# Patient Record
Sex: FEMALE | Race: WHITE | Employment: FULL TIME | URBAN - METROPOLITAN AREA
[De-identification: names, ages, dates, MRNs, and addresses within clinical notes are randomized per-mention and may not be internally consistent; named-entity substitution may affect disease eponyms.]

---

## 2017-01-09 DIAGNOSIS — I25.700 CORONARY ARTERY DISEASE INVOLVING CORONARY BYPASS GRAFT OF NATIVE HEART WITH UNSTABLE ANGINA PECTORIS (HCC): ICD-10-CM

## 2017-01-09 RX ORDER — PRASUGREL 10 MG/1
10 TABLET, FILM COATED ORAL DAILY
Qty: 30 TABLET | Refills: 12 | Status: SHIPPED | OUTPATIENT
Start: 2017-01-09 | End: 2017-01-18 | Stop reason: SDUPTHER

## 2017-01-11 ENCOUNTER — HOSPITAL ENCOUNTER (OUTPATIENT)
Dept: GENERAL RADIOLOGY | Age: 47
Discharge: HOME | End: 2017-01-11
Attending: INTERNAL MEDICINE | Admitting: INTERNAL MEDICINE

## 2017-01-11 ENCOUNTER — HOSPITAL ENCOUNTER (OUTPATIENT)
Dept: LAB | Age: 47
Discharge: HOME | End: 2017-01-11

## 2017-01-11 DIAGNOSIS — I25.700 CORONARY ARTERY DISEASE INVOLVING CORONARY BYPASS GRAFT OF NATIVE HEART WITH UNSTABLE ANGINA PECTORIS (HCC): Primary | ICD-10-CM

## 2017-01-11 DIAGNOSIS — I25.700 ATHEROSCLEROSIS OF CORONARY ARTERY BYPASS GRAFT WITH UNSTABLE ANGINA PECTORIS, UNSPECIFIED WHETHER NATIVE OR TRANSPLANTED HEART (HCC): ICD-10-CM

## 2017-01-11 LAB
ALBUMIN SERPL-MCNC: 4 GM/DL (ref 3.4–5)
ALP BLD-CCNC: 68 IU/L (ref 40–129)
ALT SERPL-CCNC: 10 U/L (ref 10–40)
ANION GAP SERPL CALCULATED.3IONS-SCNC: 8 MMOL/L (ref 4–16)
AST SERPL-CCNC: 9 IU/L (ref 15–37)
BILIRUB SERPL-MCNC: 0.5 MG/DL (ref 0–1)
BILIRUBIN DIRECT: 0.2 MG/DL (ref 0–0.3)
BILIRUBIN, INDIRECT: 0.3 MG/DL (ref 0–0.7)
BUN BLDV-MCNC: 14 MG/DL (ref 6–23)
CALCIUM SERPL-MCNC: 9.4 MG/DL (ref 8.3–10.6)
CHLORIDE BLD-SCNC: 106 MMOL/L (ref 99–110)
CHOLESTEROL: 174 MG/DL
CK MB: 1 NG/ML
CO2: 30 MMOL/L (ref 21–32)
CREAT SERPL-MCNC: 0.9 MG/DL (ref 0.6–1.1)
ERYTHROCYTE SEDIMENTATION RATE: 10 MM/HR (ref 0–20)
GFR AFRICAN AMERICAN: >60 ML/MIN/1.73M2
GFR NON-AFRICAN AMERICAN: >60 ML/MIN/1.73M2
GLUCOSE BLD-MCNC: 107 MG/DL (ref 70–140)
HCT VFR BLD CALC: 43.1 % (ref 37–47)
HDLC SERPL-MCNC: 48 MG/DL
HEMOGLOBIN: 13.6 GM/DL (ref 12.5–16)
LDL CHOLESTEROL CALCULATED: 97 MG/DL
MAGNESIUM: 2 MG/DL (ref 1.8–2.4)
MCH RBC QN AUTO: 29.8 PG (ref 27–31)
MCHC RBC AUTO-ENTMCNC: 31.6 % (ref 32–36)
MCV RBC AUTO: 94.3 FL (ref 78–100)
PDW BLD-RTO: 13.1 % (ref 11.7–14.9)
PLATELET # BLD: 292 K/CU MM (ref 140–440)
PMV BLD AUTO: 8.9 FL (ref 7.5–11.1)
POTASSIUM SERPL-SCNC: 4.3 MMOL/L (ref 3.5–5.1)
RBC # BLD: 4.57 M/CU MM (ref 4.2–5.4)
SODIUM BLD-SCNC: 144 MMOL/L (ref 135–145)
T4 FREE: 1.35 NG/DL (ref 0.9–1.8)
TOTAL PROTEIN: 6.1 GM/DL (ref 6.4–8.2)
TRIGL SERPL-MCNC: 146 MG/DL
TSH HIGH SENSITIVITY: 0.66 UIU/ML (ref 0.27–4.2)
WBC # BLD: 11.1 K/CU MM (ref 4–10.5)

## 2017-01-13 LAB
CK MACROMOLECULAR TYPE 1/CK-TOTAL: 0
CK MACROMOLECULAR TYPE 2/CK-TOTAL: 0
CK MB: 0
CK-BB: 0
CK-MM: 100
TOTAL CK: 28

## 2017-01-18 DIAGNOSIS — I25.700 CORONARY ARTERY DISEASE INVOLVING CORONARY BYPASS GRAFT OF NATIVE HEART WITH UNSTABLE ANGINA PECTORIS (HCC): ICD-10-CM

## 2017-01-18 RX ORDER — PRASUGREL 10 MG/1
10 TABLET, FILM COATED ORAL DAILY
Qty: 90 TABLET | Refills: 3 | Status: SHIPPED | OUTPATIENT
Start: 2017-01-18 | End: 2018-02-01 | Stop reason: SDUPTHER

## 2017-01-18 RX ORDER — PRASUGREL 10 MG/1
10 TABLET, FILM COATED ORAL DAILY
Qty: 7 TABLET | Refills: 0 | COMMUNITY
Start: 2017-01-18 | End: 2017-01-19 | Stop reason: SDUPTHER

## 2017-01-19 ENCOUNTER — TELEPHONE (OUTPATIENT)
Dept: CARDIOLOGY CLINIC | Age: 47
End: 2017-01-19

## 2017-01-19 DIAGNOSIS — I25.700 CORONARY ARTERY DISEASE INVOLVING CORONARY BYPASS GRAFT OF NATIVE HEART WITH UNSTABLE ANGINA PECTORIS (HCC): ICD-10-CM

## 2017-01-19 RX ORDER — PRASUGREL 10 MG/1
10 TABLET, FILM COATED ORAL DAILY
Qty: 14 TABLET | Refills: 0 | Status: ON HOLD | COMMUNITY
Start: 2017-01-19 | End: 2017-07-06 | Stop reason: SDUPTHER

## 2017-03-20 ENCOUNTER — HOSPITAL ENCOUNTER (OUTPATIENT)
Dept: LAB | Age: 47
Discharge: HOME | End: 2017-03-20
Attending: INTERNAL MEDICINE | Admitting: INTERNAL MEDICINE

## 2017-03-20 DIAGNOSIS — I25.700 CORONARY ARTERY DISEASE INVOLVING CORONARY BYPASS GRAFT OF NATIVE HEART WITH UNSTABLE ANGINA PECTORIS (HCC): Primary | ICD-10-CM

## 2017-03-20 LAB
ANION GAP SERPL CALCULATED.3IONS-SCNC: 8 MMOL/L (ref 4–16)
BUN BLDV-MCNC: 20 MG/DL (ref 6–23)
CALCIUM SERPL-MCNC: 8.8 MG/DL (ref 8.3–10.6)
CHLORIDE BLD-SCNC: 104 MMOL/L (ref 99–110)
CO2: 30 MMOL/L (ref 21–32)
CREAT SERPL-MCNC: 0.9 MG/DL (ref 0.6–1.1)
GFR AFRICAN AMERICAN: >60 ML/MIN/1.73M2
GFR NON-AFRICAN AMERICAN: >60 ML/MIN/1.73M2
GLUCOSE BLD-MCNC: 102 MG/DL (ref 70–140)
POTASSIUM SERPL-SCNC: 4.3 MMOL/L (ref 3.5–5.1)
SODIUM BLD-SCNC: 142 MMOL/L (ref 135–145)

## 2017-04-01 LAB
CHOLESTEROL: 203 MG/DL
GLUCOSE BLD-MCNC: 134 MG/DL (ref 70–140)
HBA1C MFR BLD: 5.1 % (ref 4.2–6.3)
HDLC SERPL-MCNC: 48 MG/DL
LDL CHOLESTEROL CALCULATED: 121 MG/DL
PATIENT FASTING?: YES
TRIGL SERPL-MCNC: 172 MG/DL

## 2017-04-04 ENCOUNTER — EMPLOYEE WELLNESS (OUTPATIENT)
Dept: OTHER | Age: 47
End: 2017-04-04

## 2017-04-17 ENCOUNTER — TELEPHONE (OUTPATIENT)
Dept: CARDIOLOGY CLINIC | Age: 47
End: 2017-04-17

## 2017-05-14 DIAGNOSIS — I25.700 CORONARY ARTERY DISEASE INVOLVING CORONARY BYPASS GRAFT OF NATIVE HEART WITH UNSTABLE ANGINA PECTORIS (HCC): Primary | ICD-10-CM

## 2017-05-14 RX ORDER — CARVEDILOL 3.12 MG/1
TABLET ORAL
Qty: 180 TABLET | Refills: 3 | Status: SHIPPED | OUTPATIENT
Start: 2017-05-14 | End: 2017-05-16 | Stop reason: SDUPTHER

## 2017-05-16 DIAGNOSIS — I25.700 CORONARY ARTERY DISEASE INVOLVING CORONARY BYPASS GRAFT OF NATIVE HEART WITH UNSTABLE ANGINA PECTORIS (HCC): ICD-10-CM

## 2017-05-16 DIAGNOSIS — I25.700 CORONARY ARTERY DISEASE INVOLVING CORONARY BYPASS GRAFT OF NATIVE HEART WITH UNSTABLE ANGINA PECTORIS (HCC): Primary | ICD-10-CM

## 2017-05-16 RX ORDER — ISOSORBIDE MONONITRATE 120 MG/1
120 TABLET, EXTENDED RELEASE ORAL DAILY
Qty: 90 TABLET | Refills: 3 | Status: SHIPPED | OUTPATIENT
Start: 2017-05-16 | End: 2019-03-15 | Stop reason: SDUPTHER

## 2017-05-16 RX ORDER — CARVEDILOL 3.12 MG/1
TABLET ORAL
Qty: 180 TABLET | Refills: 3 | Status: ON HOLD | OUTPATIENT
Start: 2017-05-16 | End: 2017-07-07 | Stop reason: HOSPADM

## 2017-05-16 RX ORDER — CARVEDILOL 3.12 MG/1
TABLET ORAL
Qty: 180 TABLET | Refills: 3 | OUTPATIENT
Start: 2017-05-16 | End: 2017-05-16 | Stop reason: SDUPTHER

## 2017-05-16 RX ORDER — CARVEDILOL 3.12 MG/1
TABLET ORAL
Qty: 180 TABLET | Refills: 3 | Status: SHIPPED | OUTPATIENT
Start: 2017-05-16 | End: 2017-05-16 | Stop reason: SDUPTHER

## 2017-05-16 RX ORDER — ISOSORBIDE MONONITRATE 120 MG/1
120 TABLET, EXTENDED RELEASE ORAL DAILY
Qty: 90 TABLET | Refills: 3 | OUTPATIENT
Start: 2017-05-16 | End: 2017-05-16 | Stop reason: SDUPTHER

## 2017-05-16 RX ORDER — ISOSORBIDE MONONITRATE 60 MG/1
120 TABLET, EXTENDED RELEASE ORAL DAILY
Qty: 90 TABLET | Refills: 3 | Status: SHIPPED | OUTPATIENT
Start: 2017-05-16 | End: 2017-05-16 | Stop reason: SDUPTHER

## 2017-06-02 ENCOUNTER — TELEPHONE (OUTPATIENT)
Dept: CARDIOLOGY CLINIC | Age: 47
End: 2017-06-02

## 2017-06-05 ENCOUNTER — TELEPHONE (OUTPATIENT)
Dept: CARDIOLOGY CLINIC | Age: 47
End: 2017-06-05

## 2017-06-26 ENCOUNTER — TELEPHONE (OUTPATIENT)
Dept: CARDIOLOGY CLINIC | Age: 47
End: 2017-06-26

## 2017-06-26 DIAGNOSIS — R07.9 CHEST PAIN, UNSPECIFIED TYPE: Primary | ICD-10-CM

## 2017-06-27 ENCOUNTER — PROCEDURE VISIT (OUTPATIENT)
Dept: CARDIOLOGY CLINIC | Age: 47
End: 2017-06-27

## 2017-06-27 DIAGNOSIS — Z98.61 HISTORY OF PTCA: ICD-10-CM

## 2017-06-27 DIAGNOSIS — Z95.1 HX OF CABG: Primary | ICD-10-CM

## 2017-06-27 DIAGNOSIS — R07.9 CHEST PAIN, UNSPECIFIED TYPE: ICD-10-CM

## 2017-06-27 DIAGNOSIS — I20.8 ANGINA EFFORT: ICD-10-CM

## 2017-06-27 LAB
LV EF: 60 %
LVEF MODALITY: NORMAL

## 2017-06-27 PROCEDURE — 78452 HT MUSCLE IMAGE SPECT MULT: CPT | Performed by: INTERNAL MEDICINE

## 2017-06-27 PROCEDURE — 93018 CV STRESS TEST I&R ONLY: CPT | Performed by: INTERNAL MEDICINE

## 2017-06-27 PROCEDURE — 93016 CV STRESS TEST SUPVJ ONLY: CPT | Performed by: INTERNAL MEDICINE

## 2017-06-27 PROCEDURE — 93017 CV STRESS TEST TRACING ONLY: CPT | Performed by: INTERNAL MEDICINE

## 2017-06-27 PROCEDURE — A9500 TC99M SESTAMIBI: HCPCS | Performed by: INTERNAL MEDICINE

## 2017-06-28 ENCOUNTER — TELEPHONE (OUTPATIENT)
Dept: CARDIOLOGY CLINIC | Age: 47
End: 2017-06-28

## 2017-06-28 PROBLEM — I20.89 ANGINA EFFORT: Status: ACTIVE | Noted: 2017-06-28

## 2017-06-28 PROBLEM — I20.8 ANGINA EFFORT: Status: ACTIVE | Noted: 2017-06-28

## 2017-06-30 ENCOUNTER — TELEPHONE (OUTPATIENT)
Dept: CARDIOLOGY CLINIC | Age: 47
End: 2017-06-30

## 2017-07-03 ENCOUNTER — HOSPITAL ENCOUNTER (OUTPATIENT)
Dept: LAB | Age: 47
Discharge: HOME | End: 2017-07-03
Attending: INTERNAL MEDICINE | Admitting: INTERNAL MEDICINE

## 2017-07-03 DIAGNOSIS — Z01.818 PRE-OP EVALUATION: ICD-10-CM

## 2017-07-03 LAB
ANION GAP SERPL CALCULATED.3IONS-SCNC: 11 MMOL/L (ref 4–16)
APTT: 30.1 SECONDS (ref 21.2–33)
BUN BLDV-MCNC: 14 MG/DL (ref 6–23)
CALCIUM SERPL-MCNC: 9.3 MG/DL (ref 8.3–10.6)
CHLORIDE BLD-SCNC: 102 MMOL/L (ref 99–110)
CO2: 29 MMOL/L (ref 21–32)
CREAT SERPL-MCNC: 0.9 MG/DL (ref 0.6–1.1)
GFR AFRICAN AMERICAN: >60 ML/MIN/1.73M2
GFR NON-AFRICAN AMERICAN: >60 ML/MIN/1.73M2
GLUCOSE FASTING: 91 MG/DL (ref 70–99)
HCT VFR BLD CALC: 43.3 % (ref 37–47)
HEMOGLOBIN: 14.4 GM/DL (ref 12.5–16)
INR BLD: 0.99 INDEX
MCH RBC QN AUTO: 30.8 PG (ref 27–31)
MCHC RBC AUTO-ENTMCNC: 33.3 % (ref 32–36)
MCV RBC AUTO: 92.7 FL (ref 78–100)
PDW BLD-RTO: 12.4 % (ref 11.7–14.9)
PLATELET # BLD: 308 K/CU MM (ref 140–440)
PMV BLD AUTO: 8.8 FL (ref 7.5–11.1)
POTASSIUM SERPL-SCNC: 4.5 MMOL/L (ref 3.5–5.1)
PROTHROMBIN TIME: 11.3 SECONDS (ref 9.12–12.5)
RBC # BLD: 4.67 M/CU MM (ref 4.2–5.4)
SODIUM BLD-SCNC: 142 MMOL/L (ref 135–145)
WBC # BLD: 9 K/CU MM (ref 4–10.5)

## 2017-07-11 ENCOUNTER — OFFICE VISIT (OUTPATIENT)
Dept: CARDIOLOGY CLINIC | Age: 47
End: 2017-07-11

## 2017-07-11 VITALS
WEIGHT: 168 LBS | DIASTOLIC BLOOD PRESSURE: 74 MMHG | BODY MASS INDEX: 28.68 KG/M2 | HEART RATE: 73 BPM | SYSTOLIC BLOOD PRESSURE: 100 MMHG | HEIGHT: 64 IN

## 2017-07-11 DIAGNOSIS — I20.0 UNSTABLE ANGINA (HCC): Primary | ICD-10-CM

## 2017-07-11 PROCEDURE — 99214 OFFICE O/P EST MOD 30 MIN: CPT | Performed by: INTERNAL MEDICINE

## 2017-07-11 PROCEDURE — 93000 ELECTROCARDIOGRAM COMPLETE: CPT | Performed by: INTERNAL MEDICINE

## 2017-08-14 ENCOUNTER — TELEPHONE (OUTPATIENT)
Dept: CARDIOLOGY CLINIC | Age: 47
End: 2017-08-14

## 2017-08-30 ENCOUNTER — TELEPHONE (OUTPATIENT)
Dept: CARDIOLOGY CLINIC | Age: 47
End: 2017-08-30

## 2017-10-01 ENCOUNTER — HOSPITAL ENCOUNTER (OUTPATIENT)
Dept: CARDIOLOGY | Age: 47
Discharge: HOME | End: 2017-10-31
Attending: INTERNAL MEDICINE | Admitting: INTERNAL MEDICINE

## 2017-11-01 ENCOUNTER — HOSPITAL ENCOUNTER (OUTPATIENT)
Dept: CARDIOLOGY | Age: 47
Discharge: HOME | End: 2017-11-30
Attending: INTERNAL MEDICINE | Admitting: INTERNAL MEDICINE

## 2017-11-06 ENCOUNTER — TELEPHONE (OUTPATIENT)
Dept: CARDIOLOGY CLINIC | Age: 47
End: 2017-11-06

## 2017-11-06 NOTE — TELEPHONE ENCOUNTER
Patient called requesting that her [facility] paperwork be redone as it only covers 6 months now instead of a year; she needs to have it begin on 11/24.   Call her with any questions at ph# [phone]

## 2017-11-07 ENCOUNTER — HOSPITAL ENCOUNTER (OUTPATIENT)
Dept: CARDIOLOGY | Age: 47
Discharge: HOME | End: 2017-09-30
Attending: INTERNAL MEDICINE | Admitting: INTERNAL MEDICINE

## 2017-11-10 ENCOUNTER — TELEPHONE (OUTPATIENT)
Dept: CARDIOLOGY CLINIC | Age: 47
End: 2017-11-10

## 2017-11-10 NOTE — TELEPHONE ENCOUNTER
Pt called stating she faxed [place] paperwork Monday or Tuesday this week and was making sure [facility] received it. She is going to re fax it just in case. Please call pt with any questions or updates.

## 2017-11-13 ENCOUNTER — TELEPHONE (OUTPATIENT)
Dept: CARDIOLOGY CLINIC | Age: 47
End: 2017-11-13

## 2017-11-13 NOTE — TELEPHONE ENCOUNTER
FMLA forms complete, scanned into Epic and faxed to [facility] ([facility]). Patient advised and voiced understanding.

## 2017-11-27 ENCOUNTER — TELEPHONE (OUTPATIENT)
Dept: CARDIOLOGY CLINIC | Age: 47
End: 2017-11-27

## 2017-11-30 DIAGNOSIS — I25.700 CORONARY ARTERY DISEASE INVOLVING CORONARY BYPASS GRAFT OF NATIVE HEART WITH UNSTABLE ANGINA PECTORIS (HCC): Primary | ICD-10-CM

## 2017-12-01 ENCOUNTER — HOSPITAL ENCOUNTER (OUTPATIENT)
Dept: CT IMAGING | Age: 47
Discharge: HOME | End: 2017-12-01
Attending: INTERNAL MEDICINE | Admitting: INTERNAL MEDICINE

## 2017-12-01 DIAGNOSIS — S21.90XA: ICD-10-CM

## 2017-12-01 DIAGNOSIS — S26.09XA: ICD-10-CM

## 2017-12-06 DIAGNOSIS — I25.700 CORONARY ARTERY DISEASE INVOLVING CORONARY BYPASS GRAFT OF NATIVE HEART WITH UNSTABLE ANGINA PECTORIS (HCC): Primary | ICD-10-CM

## 2017-12-06 RX ORDER — AMLODIPINE BESYLATE 5 MG/1
5 TABLET ORAL DAILY
Qty: 30 TABLET | Refills: 3 | Status: SHIPPED | OUTPATIENT
Start: 2017-12-06 | End: 2019-03-15 | Stop reason: SDUPTHER

## 2018-01-19 ENCOUNTER — TELEPHONE (OUTPATIENT)
Dept: CARDIOLOGY CLINIC | Age: 48
End: 2018-01-19

## 2018-01-26 ENCOUNTER — TELEPHONE (OUTPATIENT)
Dept: CARDIOLOGY CLINIC | Age: 48
End: 2018-01-26

## 2018-02-01 RX ORDER — PRASUGREL 10 MG/1
10 TABLET, FILM COATED ORAL DAILY
Qty: 90 TABLET | Refills: 3 | Status: SHIPPED | OUTPATIENT
Start: 2018-02-01 | End: 2019-03-15 | Stop reason: SDUPTHER

## 2018-02-06 ENCOUNTER — TELEPHONE (OUTPATIENT)
Dept: CARDIOLOGY CLINIC | Age: 48
End: 2018-02-06

## 2018-02-06 RX ORDER — METHYLPREDNISOLONE 32 MG/1
32 TABLET ORAL DAILY
Qty: 2 TABLET | Refills: 0 | Status: SHIPPED | OUTPATIENT
Start: 2018-02-06 | End: 2018-02-08

## 2018-02-06 NOTE — LETTER
? I authorize [facility]) to dispose of tissues, specimens or organs when pathology is complete. ? Use of radiology  ? A contrast agent may be required for radiology procedures. My practitioner has advised me of the risks of using, risks of not using, benefits, side effects, and alternatives. ? Observers or use of photography, video/audio recording, or televising of the procedure(s). This is for medical, scientific, or educational purposes. This includes appropriate portions of my body. My identity will not be revealed. ? I consent to release of my social security number and other identifying information to [employer] (FDA), and the supplier/, if I receive tissue, a device, or implant. This is to track the tissue, device, or implant for defect, recall, infection, etc.     ? Use of blood and/or blood products, if needed, through my hospital stay. My practitioner has advised me of the risks of using, risks of not using, benefits, side effects, and alternatives. ___ I do NOT want Blood or Blood products given. (Complete separate  refusal form)    Code Status (beulah one):  ___ I do NOT HAVE a DNR order. I am a Full-code.   I will receive CPR, intubation,  chest compressions, medications, and/or other life saving measures if I have a  cardiac or respiratory arrest.    ___ I have a Do Not Resuscitate (DNR)order.   (beulah one below)  ___  I rescind my DNR for surgery and immediate post-operative period through Phase 2 recovery. This means, for that time period, I will be a Full-code and receive CPR, intubation, chest compressions, medications, and/or other life saving measures, if I have a cardiac or respiratory arrest.    ___ I WANT to keep my DNR in effect during my procedure(s) and immediate post-operative recovery period through Phase 2 recovery.   (Complete separate refusal form)

## 2018-02-06 NOTE — TELEPHONE ENCOUNTER
Per Dr. Jenelle Daugherty, patient needs [facility]. Scheduled 2/14/18 @ 0630 per Liza. Consents to Liza.

## 2018-02-12 ENCOUNTER — HOSPITAL ENCOUNTER (OUTPATIENT)
Dept: LAB | Age: 48
Discharge: HOME | End: 2018-02-12
Attending: INTERNAL MEDICINE | Admitting: INTERNAL MEDICINE

## 2018-02-12 DIAGNOSIS — Z01.818 PRE-OP EXAM: ICD-10-CM

## 2018-02-12 LAB
ANION GAP SERPL CALCULATED.3IONS-SCNC: 7 MMOL/L (ref 4–16)
APTT: 28.1 SECONDS (ref 21.2–33)
BUN BLDV-MCNC: 15 MG/DL (ref 6–23)
CALCIUM SERPL-MCNC: 8.8 MG/DL (ref 8.3–10.6)
CHLORIDE BLD-SCNC: 100 MMOL/L (ref 99–110)
CO2: 31 MMOL/L (ref 21–32)
CREAT SERPL-MCNC: 0.8 MG/DL (ref 0.6–1.1)
EKG ATRIAL RATE: 68 BPM
EKG DIAGNOSIS: NORMAL
EKG P AXIS: 39 DEGREES
EKG P-R INTERVAL: 140 MS
EKG Q-T INTERVAL: 384 MS
EKG QRS DURATION: 86 MS
EKG QTC CALCULATION (BAZETT): 408 MS
EKG R AXIS: 22 DEGREES
EKG T AXIS: 69 DEGREES
EKG VENTRICULAR RATE: 68 BPM
GFR AFRICAN AMERICAN: >60 ML/MIN/1.73M2
GFR NON-AFRICAN AMERICAN: >60 ML/MIN/1.73M2
GLUCOSE BLD-MCNC: 89 MG/DL (ref 70–99)
HCT VFR BLD CALC: 43 % (ref 37–47)
HEMOGLOBIN: 14 GM/DL (ref 12.5–16)
INR BLD: 1.02 INDEX
MCH RBC QN AUTO: 30.2 PG (ref 27–31)
MCHC RBC AUTO-ENTMCNC: 32.6 % (ref 32–36)
MCV RBC AUTO: 92.7 FL (ref 78–100)
PDW BLD-RTO: 12.5 % (ref 11.7–14.9)
PLATELET # BLD: 294 K/CU MM (ref 140–440)
PMV BLD AUTO: 8.5 FL (ref 7.5–11.1)
POTASSIUM SERPL-SCNC: 3.9 MMOL/L (ref 3.5–5.1)
PROTHROMBIN TIME: 11.6 SECONDS (ref 9.12–12.5)
RBC # BLD: 4.64 M/CU MM (ref 4.2–5.4)
SODIUM BLD-SCNC: 138 MMOL/L (ref 135–145)
WBC # BLD: 9.2 K/CU MM (ref 4–10.5)

## 2018-03-20 VITALS — WEIGHT: 175 LBS | BODY MASS INDEX: 30.04 KG/M2

## 2018-03-30 DIAGNOSIS — I25.700 CORONARY ARTERY DISEASE INVOLVING CORONARY BYPASS GRAFT OF NATIVE HEART WITH UNSTABLE ANGINA PECTORIS (HCC): Primary | ICD-10-CM

## 2018-03-30 RX ORDER — HYDROCHLOROTHIAZIDE 25 MG/1
25 TABLET ORAL DAILY
Qty: 90 TABLET | Refills: 3 | Status: SHIPPED | OUTPATIENT
Start: 2018-03-30 | End: 2019-03-15 | Stop reason: SDUPTHER

## 2018-05-17 ENCOUNTER — TELEPHONE (OUTPATIENT)
Dept: CARDIOLOGY CLINIC | Age: 48
End: 2018-05-17

## 2018-05-21 ENCOUNTER — TELEPHONE (OUTPATIENT)
Dept: CARDIOLOGY CLINIC | Age: 48
End: 2018-05-21

## 2018-06-11 ENCOUNTER — EMPLOYEE WELLNESS (OUTPATIENT)
Dept: OTHER | Age: 48
End: 2018-06-11

## 2018-06-11 LAB
CHOLESTEROL: 201 MG/DL
GLUCOSE BLD-MCNC: 66 MG/DL (ref 70–99)
HDLC SERPL-MCNC: 52 MG/DL
LDL CHOLESTEROL CALCULATED: 126 MG/DL
PATIENT FASTING?: YES
TRIGL SERPL-MCNC: 115 MG/DL

## 2018-06-19 VITALS — BODY MASS INDEX: 29.87 KG/M2 | WEIGHT: 174 LBS

## 2018-07-16 ENCOUNTER — HOSPITAL ENCOUNTER (OUTPATIENT)
Dept: LAB | Age: 48
Discharge: HOME | End: 2018-07-16
Attending: INTERNAL MEDICINE | Admitting: INTERNAL MEDICINE

## 2018-07-16 LAB
ANION GAP SERPL CALCULATED.3IONS-SCNC: 13 MMOL/L (ref 4–16)
BUN BLDV-MCNC: 12 MG/DL (ref 6–23)
CALCIUM SERPL-MCNC: 9.3 MG/DL (ref 8.3–10.6)
CHLORIDE BLD-SCNC: 102 MMOL/L (ref 99–110)
CO2: 28 MMOL/L (ref 21–32)
CREAT SERPL-MCNC: 0.8 MG/DL (ref 0.6–1.1)
GFR AFRICAN AMERICAN: >60 ML/MIN/1.73M2
GFR NON-AFRICAN AMERICAN: >60 ML/MIN/1.73M2
GLUCOSE BLD-MCNC: 96 MG/DL (ref 70–99)
HCT VFR BLD CALC: 44.4 % (ref 37–47)
HEMOGLOBIN: 14.8 GM/DL (ref 12.5–16)
MAGNESIUM: 1.8 MG/DL (ref 1.8–2.4)
MCH RBC QN AUTO: 31 PG (ref 27–31)
MCHC RBC AUTO-ENTMCNC: 33.3 % (ref 32–36)
MCV RBC AUTO: 93.1 FL (ref 78–100)
PDW BLD-RTO: 12.5 % (ref 11.7–14.9)
PHOSPHORUS: 3.5 MG/DL (ref 2.5–4.9)
PLATELET # BLD: 270 K/CU MM (ref 140–440)
PMV BLD AUTO: 8.5 FL (ref 7.5–11.1)
POTASSIUM SERPL-SCNC: 3.5 MMOL/L (ref 3.5–5.1)
RBC # BLD: 4.77 M/CU MM (ref 4.2–5.4)
SODIUM BLD-SCNC: 143 MMOL/L (ref 135–145)
WBC # BLD: 6.3 K/CU MM (ref 4–10.5)

## 2018-10-03 ENCOUNTER — PROCEDURE VISIT (OUTPATIENT)
Dept: CARDIOLOGY CLINIC | Age: 48
End: 2018-10-03
Payer: COMMERCIAL

## 2018-10-03 ENCOUNTER — TELEPHONE (OUTPATIENT)
Dept: CARDIOLOGY CLINIC | Age: 48
End: 2018-10-03

## 2018-10-03 DIAGNOSIS — R42 DIZZINESS: Primary | ICD-10-CM

## 2018-10-03 PROCEDURE — 93880 EXTRACRANIAL BILAT STUDY: CPT | Performed by: INTERNAL MEDICINE

## 2018-10-05 ENCOUNTER — TELEPHONE (OUTPATIENT)
Dept: CARDIOLOGY CLINIC | Age: 48
End: 2018-10-05

## 2019-03-15 ENCOUNTER — NURSE ONLY (OUTPATIENT)
Dept: CARDIOLOGY CLINIC | Age: 49
End: 2019-03-15
Payer: COMMERCIAL

## 2019-03-15 ENCOUNTER — OFFICE VISIT (OUTPATIENT)
Dept: CARDIOLOGY CLINIC | Age: 49
End: 2019-03-15
Payer: COMMERCIAL

## 2019-03-15 VITALS
SYSTOLIC BLOOD PRESSURE: 110 MMHG | BODY MASS INDEX: 32.25 KG/M2 | DIASTOLIC BLOOD PRESSURE: 70 MMHG | HEART RATE: 68 BPM | WEIGHT: 182 LBS | HEIGHT: 63 IN

## 2019-03-15 DIAGNOSIS — I25.700 CORONARY ARTERY DISEASE INVOLVING CORONARY BYPASS GRAFT OF NATIVE HEART WITH UNSTABLE ANGINA PECTORIS (HCC): ICD-10-CM

## 2019-03-15 DIAGNOSIS — R00.0 TACHYCARDIA: ICD-10-CM

## 2019-03-15 DIAGNOSIS — R07.9 CHEST PAIN, UNSPECIFIED TYPE: Primary | ICD-10-CM

## 2019-03-15 DIAGNOSIS — R00.0 TACHYCARDIA: Primary | ICD-10-CM

## 2019-03-15 PROCEDURE — 99214 OFFICE O/P EST MOD 30 MIN: CPT | Performed by: INTERNAL MEDICINE

## 2019-03-15 PROCEDURE — 93000 ELECTROCARDIOGRAM COMPLETE: CPT | Performed by: INTERNAL MEDICINE

## 2019-03-15 PROCEDURE — 93228 REMOTE 30 DAY ECG REV/REPORT: CPT | Performed by: INTERNAL MEDICINE

## 2019-03-15 RX ORDER — HYDROCHLOROTHIAZIDE 25 MG/1
25 TABLET ORAL DAILY
Qty: 90 TABLET | Refills: 3 | Status: SHIPPED | OUTPATIENT
Start: 2019-03-15 | End: 2020-09-23 | Stop reason: SDUPTHER

## 2019-03-15 RX ORDER — RANOLAZINE 1000 MG/1
1000 TABLET, EXTENDED RELEASE ORAL 2 TIMES DAILY
Qty: 60 TABLET | Refills: 5 | Status: SHIPPED | OUTPATIENT
Start: 2019-03-15 | End: 2020-09-23 | Stop reason: SDUPTHER

## 2019-03-15 RX ORDER — AMLODIPINE BESYLATE 5 MG/1
5 TABLET ORAL DAILY
Qty: 30 TABLET | Refills: 5 | Status: SHIPPED | OUTPATIENT
Start: 2019-03-15 | End: 2022-05-04 | Stop reason: CLARIF

## 2019-03-15 RX ORDER — ISOSORBIDE MONONITRATE 120 MG/1
120 TABLET, EXTENDED RELEASE ORAL DAILY
Qty: 90 TABLET | Refills: 3 | Status: SHIPPED | OUTPATIENT
Start: 2019-03-15 | End: 2022-05-04 | Stop reason: SDUPTHER

## 2019-03-15 RX ORDER — PRASUGREL 10 MG/1
10 TABLET, FILM COATED ORAL DAILY
Qty: 90 TABLET | Refills: 3 | Status: SHIPPED | OUTPATIENT
Start: 2019-03-15 | End: 2019-08-22 | Stop reason: SDUPTHER

## 2019-03-15 RX ORDER — TRAZODONE HYDROCHLORIDE 50 MG/1
1 TABLET ORAL NIGHTLY PRN
COMMUNITY
End: 2022-05-04 | Stop reason: CLARIF

## 2019-03-15 RX ORDER — METOPROLOL SUCCINATE 25 MG/1
12.5 TABLET, EXTENDED RELEASE ORAL DAILY
Qty: 30 TABLET | Refills: 5 | Status: SHIPPED | OUTPATIENT
Start: 2019-03-15 | End: 2022-05-04 | Stop reason: CLARIF

## 2019-03-15 RX ORDER — LISINOPRIL AND HYDROCHLOROTHIAZIDE 20; 12.5 MG/1; MG/1
1 TABLET ORAL DAILY
COMMUNITY
End: 2022-05-04 | Stop reason: CLARIF

## 2019-04-25 ENCOUNTER — TELEPHONE (OUTPATIENT)
Dept: CARDIOLOGY CLINIC | Age: 49
End: 2019-04-25

## 2019-04-26 ENCOUNTER — TELEPHONE (OUTPATIENT)
Dept: CARDIOLOGY CLINIC | Age: 49
End: 2019-04-26

## 2019-04-29 ENCOUNTER — TELEPHONE (OUTPATIENT)
Dept: CARDIOLOGY CLINIC | Age: 49
End: 2019-04-29

## 2019-08-22 RX ORDER — PRASUGREL 10 MG/1
10 TABLET, FILM COATED ORAL DAILY
Qty: 90 TABLET | Refills: 3 | Status: SHIPPED | OUTPATIENT
Start: 2019-08-22 | End: 2020-08-27 | Stop reason: SDUPTHER

## 2020-05-13 ENCOUNTER — TELEPHONE (OUTPATIENT)
Dept: CARDIOLOGY CLINIC | Age: 50
End: 2020-05-13

## 2020-05-13 NOTE — TELEPHONE ENCOUNTER
Called patient to schedule STAT Cardiolite. Left message for patient to return my call. Authorization not required.

## 2020-05-14 ENCOUNTER — PROCEDURE VISIT (OUTPATIENT)
Dept: CARDIOLOGY CLINIC | Age: 50
End: 2020-05-14
Payer: COMMERCIAL

## 2020-05-14 PROCEDURE — 93017 CV STRESS TEST TRACING ONLY: CPT | Performed by: INTERNAL MEDICINE

## 2020-05-14 PROCEDURE — 93016 CV STRESS TEST SUPVJ ONLY: CPT | Performed by: INTERNAL MEDICINE

## 2020-05-14 PROCEDURE — 93018 CV STRESS TEST I&R ONLY: CPT | Performed by: INTERNAL MEDICINE

## 2020-05-14 PROCEDURE — 78452 HT MUSCLE IMAGE SPECT MULT: CPT | Performed by: INTERNAL MEDICINE

## 2020-05-14 PROCEDURE — A9500 TC99M SESTAMIBI: HCPCS | Performed by: INTERNAL MEDICINE

## 2020-05-15 LAB
LV EF: 60 %
LVEF MODALITY: NORMAL

## 2020-05-18 ENCOUNTER — TELEPHONE (OUTPATIENT)
Dept: CARDIOLOGY CLINIC | Age: 50
End: 2020-05-18

## 2020-05-18 ENCOUNTER — TELEMEDICINE (OUTPATIENT)
Dept: CARDIOLOGY CLINIC | Age: 50
End: 2020-05-18
Payer: COMMERCIAL

## 2020-05-18 VITALS
SYSTOLIC BLOOD PRESSURE: 128 MMHG | HEIGHT: 63 IN | HEART RATE: 62 BPM | WEIGHT: 178 LBS | BODY MASS INDEX: 31.54 KG/M2 | DIASTOLIC BLOOD PRESSURE: 78 MMHG

## 2020-05-18 PROCEDURE — 99214 OFFICE O/P EST MOD 30 MIN: CPT | Performed by: INTERNAL MEDICINE

## 2020-05-18 NOTE — TELEPHONE ENCOUNTER
[place] Dr. Husam Ruggiero .   Mario Jordan nuclear scintigraphic study suggestive of normal myocardial    perfusion.    Gated images demonstrate normal left ventricular systolic function with EF    of 60 %. Advised pt of stress test results.

## 2020-05-18 NOTE — PROGRESS NOTES
or heartburn  Genito-Urinary: Negative for urinary frequency/urgency  Musculoskeletal: Negative for muscle pain, muscular weakness, negative for pain in arm and leg or swelling in foot and leg  Neurological: Negative for dizziness, headaches, memory loss, numbness/tingling, visual changes, syncope  Dermatological: Negative for rash    Objective:  /78   Pulse 62   Ht 5' 3\" (1.6 m)   Wt 178 lb (80.7 kg)   BMI 31.53 kg/m²   Wt Readings from Last 3 Encounters:   05/18/20 178 lb (80.7 kg)   03/15/19 182 lb (82.6 kg)   06/11/18 174 lb (78.9 kg)     Body mass index is 31.53 kg/m². GENERAL - Alert, oriented, pleasant, in no apparent distress. EYES: No jaundice, no conjunctival pallor. SKIN: It is warm & dry. No rashes. No Echhymosis    HEENT - No clinically significant abnormalities seen. Neck - Supple.     Lab Review   Lab Results   Component Value Date    CKTOTAL 28 01/11/2017    CKMB 0 01/11/2017    CKMB 1.0 01/11/2017     BNP:  No results found for: BNP  PT/INR:    Lab Results   Component Value Date    INR 1.02 02/12/2018     Lab Results   Component Value Date    LABA1C 5.1 04/01/2017    LABA1C 5.4 12/10/2014     Lab Results   Component Value Date    WBC 6.3 07/16/2018    HCT 44.4 07/16/2018    MCV 93.1 07/16/2018     07/16/2018     Lab Results   Component Value Date    CHOL 201 (H) 06/11/2018    TRIG 115 06/11/2018    HDL 52 06/11/2018    LDLCALC 126 (H) 06/11/2018    LDLDIRECT 154 (H) 05/19/2016     Lab Results   Component Value Date    ALT 10 01/11/2017    AST 9 (L) 01/11/2017     BMP:    Lab Results   Component Value Date     07/16/2018    K 3.5 07/16/2018     07/16/2018    CO2 28 07/16/2018    BUN 12 07/16/2018    CREATININE 0.8 07/16/2018     CMP:   Lab Results   Component Value Date     07/16/2018    K 3.5 07/16/2018     07/16/2018    CO2 28 07/16/2018    BUN 12 07/16/2018    PROT 6.1 01/11/2017    PROT 6.9 04/09/2012     TSH:    Lab Results   Component Value Date expedited in -person care. Pursuant to the emergency declaration under the [address] waiver authority and the [employer] Act, this Virtual  Visit was conducted, with patient's consent, to reduce the patient's risk of exposure to COVID-19 and provide continuity of care for an established patient. Services were provided through a  discussion on pphone to substitute for in-person clinic visit. I Confirm this is a Patient Initiated Episode with an Established Patient who has not had a related appointment within my department in the past 7 days or scheduled within the next 24 hours. The call was not tied to face to face office visit or procedure that has occurred in in prior 7 days.   Based on our conversation /evaluation , a subsequent office visit for the patient's problem is not indicated for  24 hrs      Time spent; 25 m  Location; [facility], [address], [address] [facility]  Office staff nidia LAM were utilised                     [facility]

## 2020-08-26 RX ORDER — PRASUGREL 10 MG/1
10 TABLET, FILM COATED ORAL DAILY
Qty: 30 TABLET | Refills: 2 | OUTPATIENT
Start: 2020-08-26

## 2020-08-27 RX ORDER — PRASUGREL 10 MG/1
10 TABLET, FILM COATED ORAL DAILY
Qty: 90 TABLET | Refills: 0 | Status: SHIPPED | OUTPATIENT
Start: 2020-08-27 | End: 2020-09-23 | Stop reason: SDUPTHER

## 2020-08-27 NOTE — TELEPHONE ENCOUNTER
Patient called stating that Dr. Karen Lennon wants to see her before refilling her Effient, appointment is scheduled. Patient is out and needs Effient sent to [facility] in [place], 90 day supply.

## 2020-09-23 ENCOUNTER — OFFICE VISIT (OUTPATIENT)
Dept: CARDIOLOGY CLINIC | Age: 50
End: 2020-09-23
Payer: COMMERCIAL

## 2020-09-23 VITALS
SYSTOLIC BLOOD PRESSURE: 128 MMHG | DIASTOLIC BLOOD PRESSURE: 76 MMHG | WEIGHT: 191.6 LBS | HEIGHT: 63 IN | HEART RATE: 62 BPM | BODY MASS INDEX: 33.95 KG/M2

## 2020-09-23 PROCEDURE — 99214 OFFICE O/P EST MOD 30 MIN: CPT | Performed by: INTERNAL MEDICINE

## 2020-09-23 RX ORDER — HYDROCHLOROTHIAZIDE 25 MG/1
25 TABLET ORAL DAILY
Qty: 90 TABLET | Refills: 3 | Status: SHIPPED | OUTPATIENT
Start: 2020-09-23 | End: 2021-05-24 | Stop reason: SDUPTHER

## 2020-09-23 RX ORDER — PRASUGREL 10 MG/1
10 TABLET, FILM COATED ORAL DAILY
Qty: 90 TABLET | Refills: 0 | Status: SHIPPED | OUTPATIENT
Start: 2020-09-23 | End: 2021-05-24 | Stop reason: SDUPTHER

## 2020-09-23 RX ORDER — RANOLAZINE 1000 MG/1
1000 TABLET, EXTENDED RELEASE ORAL 2 TIMES DAILY
Qty: 60 TABLET | Refills: 5 | Status: SHIPPED | OUTPATIENT
Start: 2020-09-23 | End: 2022-05-04 | Stop reason: SDUPTHER

## 2020-09-23 RX ORDER — ISOSORBIDE MONONITRATE 30 MG/1
30 TABLET, EXTENDED RELEASE ORAL DAILY
Qty: 30 TABLET | Refills: 3 | Status: SHIPPED | OUTPATIENT
Start: 2020-09-23 | End: 2022-05-04 | Stop reason: CLARIF

## 2020-09-23 NOTE — PROGRESS NOTES
CARDIOLOGY NOTE      9/23/2020    RE: Angelika Luz  (1970)                               TO:  Dr. Marley Casey (Inactive)            Theador Player is a 45 y.o. female who was seen today for management of  cad                                    HPI:                   The patient has cardiac complaints of recurrent mid sternal mild nonexertional CP with mild SOB for  afew weeks  H/o cad, htn, hyperlipidimea  Patient also seen  for    - Coronary artery disease, has chest pain. Patient is  compliant with prescribed medicines. - Hypertension,is  well controlled, pt is  compliant with medicines  - Hyperlipidimea, importance of hyperlipidimea discussed with pt.        Angelika Luz has the following history recorded in care path:  Patient Active Problem List    Diagnosis Date Noted    Angina effort 06/28/2017     Priority: High    Chest pain 08/26/2016     Priority: Low    Coronary artery disease involving coronary bypass graft of native heart with unstable angina pectoris (HCC)      Priority: Low    Angina effort      Priority: Low    Unstable angina (HCC)      Priority: Low    Hypertension      Priority: Low    Arterial vascular disease 12/10/2014     Priority: Low    Carotid artery narrowing 12/10/2014     Priority: Low    BP (high blood pressure) 12/10/2014     Priority: Low    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     Priority: Low    Restless leg 12/10/2014     Priority: Low    Nerve root disorder 12/10/2014     Priority: Low    CAD (coronary artery disease)      Priority: Low    Angina pectoris syndrome ([facility] [address] 11/26/2014     Priority: Low    HLD (hyperlipidemia) 05/20/2013     Priority: Low    Benign essential HTN 05/20/2013     Priority: Low    Cannot sleep 05/20/2013     Priority: Low    L-S radiculopathy 05/20/2013     Priority: Low     Current Outpatient Medications   Medication Sig Dispense Refill    prasugrel (EFFIENT) 10 MG TABS Take 1 tablet by mouth daily 90 tablet 0    hydroCHLOROthiazide (HYDRODIURIL) 25 MG tablet Take 1 tablet by mouth daily 90 tablet 3    ranolazine (RANEXA) 1000 MG extended release tablet Take 1 tablet by mouth 2 times daily 60 tablet 5    traZODone (DESYREL) 50 MG tablet 1 tablet nightly as needed      amLODIPine (NORVASC) 5 MG tablet Take 1 tablet by mouth daily (Patient not taking: Reported on 5/18/2020) 30 tablet 5    isosorbide mononitrate (IMDUR) 120 MG extended release tablet Take 1 tablet by mouth daily 90 tablet 3    metoprolol succinate (TOPROL XL) 25 MG extended release tablet Take 0.5 tablets by mouth daily (Patient not taking: Reported on 5/18/2020) 30 tablet 5    lisinopril-hydrochlorothiazide (PRINZIDE;ZESTORETIC) 20-12.5 MG per tablet Take 1 tablet by mouth daily      GABAPENTIN PO Take 900 mg by mouth nightly      nitroGLYCERIN (NITROSTAT) 0.4 MG SL tablet Place 1 tablet under the tongue every 5 minutes as needed 25 tablet 3    LORazepam (ATIVAN) 0.5 MG tablet Take 0.5 mg by mouth daily as needed for Anxiety (& **Pulling Pain from CABG.**)       pravastatin (PRAVACHOL) 10 MG tablet Take 1 tablet by mouth daily. (Patient not taking: Reported on 5/18/2020) 30 tablet 3    ibuprofen (ADVIL;MOTRIN) 800 MG tablet Take 800 mg by mouth every 8 hours as needed        No current facility-administered medications for this visit. Allergies: Iodine; Atorvastatin; and Escitalopram oxalate  Past Medical History:   Diagnosis Date    CAD (coronary artery disease)     Family history of coronary artery disease     H/O angioplasty 07/06/2017     Coronary Angiography    H/O cardiovascular stress test 3/24/2016    mild to moderate ischemia RCA,EF70%    H/O Doppler ultrasound 10/03/2018    mild stenosis LISSA, no stenosis LICA    H/O echocardiogram 02/23/15    EF 60% Normal LV and systolic function.  Mild MR and TR     History of Holter monitoring 08/26/2016    holter - sinus rhythm    History of nuclear stress test 08/29/2016 cardiolite-normal,EF70%    History of nuclear stress test 05/14/2020    Cardiolite-normal,EF60%    Hx of cardiovascular stress test 11/3/2015    cardiolite-normal,EF70%    Hx of cardiovascular stress test 06/27/2017    Normal study    Hyperlipidemia     Hypertension     kidney stones     Restless legs syndrome     S/P CABG (coronary artery bypass graft) 12/10/14    x3 cabg SVG to Ramus and LIMA to LAD and DIAG     Past Surgical History:   Procedure Laterality Date    BALLOON ANGIOPLASTY, ARTERY  07/06/2017    BLADDER SUSPENSION  2004    CHOLECYSTECTOMY  1984    CORONARY ANGIOPLASTY WITH STENT PLACEMENT      CORONARY ARTERY BYPASS GRAFT  12/10/2014    x3 cabg SVG to Ramus and LIMA to LAD and DIAG    HYSTERECTOMY  1999    LITHOTRIPSY        As reviewed   Family History   Problem Relation Age of Onset    Heart Failure Mother     Hypertension Mother     High Cholesterol Mother     Diabetes Mother     Heart Surgery Mother 47        CABGx3    Stroke Father     Hypertension Father     Diabetes Father     Heart Surgery Sister 29        CABGx3, and several stents    Heart Defect Sister         \"whole in her heart\"    Hypertension Sister     High Cholesterol Sister     Heart Surgery Brother 52        CABGx4    Hypertension Brother     High Cholesterol Brother     Hypertension Maternal Grandfather      Social History     Tobacco Use    Smoking status: Never Smoker    Smokeless tobacco: Never Used    Tobacco comment: 4/4/16   Substance Use Topics    Alcohol use: No      Review of Systems:    Constitutional: Negative for diaphoresis and fatigue  Psychological:Negative for anxiety or depression  HENT: Negative for headaches, nasal congestion, sinus pain or vertigo  Eyes: Negative for visual disturbance.    Endocrine: Negative for polydipsia/polyuria  Respiratory: Negative for shortness of breath  Cardiovascular: CP  Gastrointestinal: Negative for abdominal pain or heartburn  Genito-Urinary: Negative for urinary frequency/urgency  Musculoskeletal: Negative for muscle pain, muscular weakness, negative for pain in arm and leg or swelling in foot and leg  Neurological: Negative for dizziness, headaches, memory loss, numbness/tingling, visual changes, syncope  Dermatological: Negative for rash    Objective:    Vitals:    09/23/20 1533   BP: 128/76   Pulse: 62   Weight: 191 lb 9.6 oz (86.9 kg)   Height: 5' 3\" (1.6 m)     /76   Pulse 62   Ht 5' 3\" (1.6 m)   Wt 191 lb 9.6 oz (86.9 kg)   BMI 33.94 kg/m²     No flowsheet data found. Wt Readings from Last 3 Encounters:   09/23/20 191 lb 9.6 oz (86.9 kg)   05/18/20 178 lb (80.7 kg)   03/15/19 182 lb (82.6 kg)     Body mass index is 33.94 kg/m². GENERAL - Alert, oriented, pleasant, in no apparent distress. EYES: No jaundice, no conjunctival pallor. SKIN: It is warm & dry. No rashes. No Echhymosis    HEENT - No clinically significant abnormalities seen. Neck - Supple. No jugular venous distention noted. No carotid bruits. Cardiovascular - Normal S1 and S2 without obvious murmur or gallop. Extremities - No cyanosis, clubbing, or significant edema. Pulmonary - No respiratory distress. No wheezes or rales. Abdomen - No masses, tenderness, or organomegaly. Musculoskeletal - No significant edema. No joint deformities. No muscle wasting. Neurologic - Cranial nerves II through XII are grossly intact. There were no gross focal neurologic abnormalities.     Lab Review   Lab Results   Component Value Date    CKTOTAL 28 01/11/2017    CKMB 0 01/11/2017    CKMB 1.0 01/11/2017     BNP:  No results found for: BNP  PT/INR:    Lab Results   Component Value Date    INR 1.02 02/12/2018     Lab Results   Component Value Date    LABA1C 5.1 04/01/2017    LABA1C 5.4 12/10/2014     Lab Results   Component Value Date    WBC 6.3 07/16/2018    HCT 44.4 07/16/2018    MCV 93.1 07/16/2018     07/16/2018     Lab Results   Component Value Date    CHOL 201 (H) 06/11/2018    TRIG 115 06/11/2018    HDL 52 06/11/2018    LDLCALC 126 (H) 06/11/2018    LDLDIRECT 154 (H) 05/19/2016     Lab Results   Component Value Date    ALT 10 01/11/2017    AST 9 (L) 01/11/2017     BMP:    Lab Results   Component Value Date     07/16/2018    K 3.5 07/16/2018     07/16/2018    CO2 28 07/16/2018    BUN 12 07/16/2018    CREATININE 0.8 07/16/2018     CMP:   Lab Results   Component Value Date     07/16/2018    K 3.5 07/16/2018     07/16/2018    CO2 28 07/16/2018    BUN 12 07/16/2018    PROT 6.1 01/11/2017    PROT 6.9 04/09/2012     TSH:    Lab Results   Component Value Date    TSHHS 0.657 01/11/2017           Impression:    No diagnosis found. Patient Active Problem List   Diagnosis Code    CAD (coronary artery disease) I25.10    Arterial vascular disease I70.90    Carotid artery narrowing I65.29    HLD (hyperlipidemia) E78.5    BP (high blood pressure) I10    Benign essential HTN I10    Cannot sleep G47.00    Angina pectoris syndrome (HCC) I20.9    Lesion of lateral popliteal nerve G57.30    L-S radiculopathy M54.17    Restless leg G25.81    Nerve root disorder G54.9    Hypertension I10    Unstable angina (HCC) I20.0    Angina effort I20.8    Coronary artery disease involving coronary bypass graft of native heart with unstable angina pectoris (HCC) I25.700    Chest pain R07.9    Angina effort I20.8       Assessment & Plan:             -     CORONARY ARTERY DISEASE:  symptomatic     All available  tests in chart reviewed. Management discussed . Testing ordered  no        Add imdur                         -  Hypertension: Patients blood pressure is normal. Patient is advised about low sodium diet. Present medical regimen will not be changed. -  LIPID MANAGEMENT:  Importance of lipid levels discussed with patient   and patient was given dietary advice. NCEP- ATP III guidelines reviewed with patient.     -   Changes  in medicines made: No                         Mortality from the morbid obesity is very high: Body mass index is 33.94 kg/m².                  Abril Goel MD    [facility]

## 2020-09-23 NOTE — LETTER
Audrey Nichole [address] [place] [address]  1970  [id number]    Have you had any Chest Pain - No      Have you had any Shortness of Breath - No      Have you had any dizziness - No     Have you had any palpitations - No          Do you have a surgery or procedure scheduled in the near future - No    ***

## 2021-05-24 RX ORDER — PRASUGREL 10 MG/1
10 TABLET, FILM COATED ORAL DAILY
Qty: 90 TABLET | Refills: 0 | Status: SHIPPED | OUTPATIENT
Start: 2021-05-24 | End: 2022-02-28 | Stop reason: SDUPTHER

## 2021-05-24 RX ORDER — HYDROCHLOROTHIAZIDE 25 MG/1
25 TABLET ORAL DAILY
Qty: 90 TABLET | Refills: 3 | Status: SHIPPED | OUTPATIENT
Start: 2021-05-24 | End: 2022-05-04 | Stop reason: SDUPTHER

## 2022-02-28 RX ORDER — PRASUGREL 10 MG/1
10 TABLET, FILM COATED ORAL DAILY
Qty: 90 TABLET | Refills: 0 | Status: SHIPPED | OUTPATIENT
Start: 2022-02-28 | End: 2022-05-04 | Stop reason: SDUPTHER

## 2022-05-04 ENCOUNTER — OFFICE VISIT (OUTPATIENT)
Dept: CARDIOLOGY CLINIC | Age: 52
End: 2022-05-04
Payer: COMMERCIAL

## 2022-05-04 VITALS
HEART RATE: 70 BPM | DIASTOLIC BLOOD PRESSURE: 82 MMHG | SYSTOLIC BLOOD PRESSURE: 118 MMHG | HEIGHT: 64 IN | WEIGHT: 214 LBS | BODY MASS INDEX: 36.54 KG/M2

## 2022-05-04 DIAGNOSIS — E78.2 MIXED HYPERLIPIDEMIA: ICD-10-CM

## 2022-05-04 DIAGNOSIS — I25.118 CORONARY ARTERY DISEASE OF NATIVE ARTERY OF NATIVE HEART WITH STABLE ANGINA PECTORIS (HCC): Primary | ICD-10-CM

## 2022-05-04 DIAGNOSIS — I10 PRIMARY HYPERTENSION: ICD-10-CM

## 2022-05-04 PROCEDURE — 99214 OFFICE O/P EST MOD 30 MIN: CPT | Performed by: NURSE PRACTITIONER

## 2022-05-04 PROCEDURE — 93000 ELECTROCARDIOGRAM COMPLETE: CPT | Performed by: NURSE PRACTITIONER

## 2022-05-04 RX ORDER — HYDROCHLOROTHIAZIDE 25 MG/1
25 TABLET ORAL DAILY
Qty: 90 TABLET | Refills: 3 | Status: SHIPPED | OUTPATIENT
Start: 2022-05-04

## 2022-05-04 RX ORDER — RANOLAZINE 1000 MG/1
1000 TABLET, EXTENDED RELEASE ORAL 2 TIMES DAILY
Qty: 60 TABLET | Refills: 5 | Status: SHIPPED | OUTPATIENT
Start: 2022-05-04 | End: 2023-05-05

## 2022-05-04 RX ORDER — ISOSORBIDE MONONITRATE 120 MG/1
120 TABLET, EXTENDED RELEASE ORAL DAILY
Qty: 90 TABLET | Refills: 3 | Status: SHIPPED | OUTPATIENT
Start: 2022-05-04

## 2022-05-04 RX ORDER — NITROGLYCERIN 0.4 MG/1
0.4 TABLET SUBLINGUAL EVERY 5 MIN PRN
Qty: 25 TABLET | Refills: 3 | Status: SHIPPED | OUTPATIENT
Start: 2022-05-04

## 2022-05-04 RX ORDER — EZETIMIBE 10 MG/1
10 TABLET ORAL DAILY
COMMUNITY
Start: 2021-10-31

## 2022-05-04 RX ORDER — NITROGLYCERIN 0.4 MG/1
0.4 TABLET SUBLINGUAL EVERY 5 MIN PRN
Qty: 25 TABLET | Refills: 3 | Status: CANCELLED | OUTPATIENT
Start: 2022-05-04

## 2022-05-04 RX ORDER — RANOLAZINE 1000 MG/1
1000 TABLET, EXTENDED RELEASE ORAL 2 TIMES DAILY
Qty: 60 TABLET | Refills: 5 | Status: CANCELLED | OUTPATIENT
Start: 2022-05-04 | End: 2023-05-05

## 2022-05-04 RX ORDER — PRASUGREL 10 MG/1
10 TABLET, FILM COATED ORAL DAILY
Qty: 90 TABLET | Refills: 0 | Status: SHIPPED | OUTPATIENT
Start: 2022-05-04

## 2022-05-04 RX ORDER — HYDROCHLOROTHIAZIDE 25 MG/1
25 TABLET ORAL DAILY
Qty: 90 TABLET | Refills: 3 | Status: CANCELLED | OUTPATIENT
Start: 2022-05-04

## 2022-05-04 RX ORDER — POTASSIUM CHLORIDE 750 MG/1
10 CAPSULE, EXTENDED RELEASE ORAL
COMMUNITY
Start: 2021-03-17

## 2022-05-04 RX ORDER — PRASUGREL 10 MG/1
10 TABLET, FILM COATED ORAL DAILY
Qty: 90 TABLET | Refills: 0 | Status: CANCELLED | OUTPATIENT
Start: 2022-05-04

## 2022-05-04 NOTE — PROGRESS NOTES
5/5/2022  Primary cardiologist: Dr. Eileen Quesada:   Monica Ramirez  is an established 47 y.o.  female here for a  follow up on CAD- HTN- HLD      SUBJECTIVE/OBJECTIVE:  Monica Ramirez is a 47 y.o. female with a history of coronary artery disease, CABG x3, PCI hypertension, hyperlipidemia      HPI :   Monica Ramirez reports she has noticed chest pain- sharp pain to a heaviness with associated bilateral arm heaviness and fatigue. She also reports also shortness of breath. The discomfort and fatigue is similar to what she had prior to her CABG. Review of Systems   Constitutional: Negative for diaphoresis and malaise/fatigue. Cardiovascular: Positive for chest pain, dyspnea on exertion and leg swelling. Negative for claudication, irregular heartbeat, near-syncope, orthopnea, palpitations and paroxysmal nocturnal dyspnea. Respiratory: Positive for shortness of breath. Neurological: Negative for dizziness and light-headedness. Vitals:    05/04/22 1629   BP: 118/82   Site: Left Upper Arm   Position: Sitting   Cuff Size: Medium Adult   Pulse: 70   Weight: 214 lb (97.1 kg)   Height: 5' 4\" (1.626 m)     No flowsheet data found. Wt Readings from Last 3 Encounters:   05/04/22 214 lb (97.1 kg)   09/23/20 191 lb 9.6 oz (86.9 kg)   05/18/20 178 lb (80.7 kg)     Body mass index is 36.73 kg/m². Physical Exam  Vitals reviewed. Constitutional:       Appearance: She is obese. Eyes:      Extraocular Movements: Extraocular movements intact. Pupils: Pupils are equal, round, and reactive to light. Neck:      Vascular: No carotid bruit. Cardiovascular:      Rate and Rhythm: Normal rate and regular rhythm. Pulses: Normal pulses. Heart sounds: Normal heart sounds. Pulmonary:      Effort: Pulmonary effort is normal.      Breath sounds: Normal breath sounds. Abdominal:      General: There is no distension. Tenderness: There is no abdominal tenderness. Musculoskeletal:      Right lower leg: No edema.       Left lower leg: No edema. Skin:     General: Skin is warm and dry. Capillary Refill: Capillary refill takes less than 2 seconds. Neurological:      General: No focal deficit present. Mental Status: She is alert and oriented to person, place, and time. Current Outpatient Medications   Medication Sig Dispense Refill    ezetimibe (ZETIA) 10 MG tablet Take 10 mg by mouth daily      potassium chloride (MICRO-K) 10 MEQ extended release capsule Take 10 mEq by mouth States is taking every other day.  nitroGLYCERIN (NITROSTAT) 0.4 MG SL tablet Place 1 tablet under the tongue every 5 minutes as needed (as needed) 25 tablet 3    ranolazine (RANEXA) 1000 MG extended release tablet Take 1 tablet by mouth 2 times daily 60 tablet 5    prasugrel (EFFIENT) 10 MG TABS Take 1 tablet by mouth daily 90 tablet 0    isosorbide mononitrate (IMDUR) 120 MG extended release tablet Take 1 tablet by mouth daily 90 tablet 3    hydroCHLOROthiazide (HYDRODIURIL) 25 MG tablet Take 1 tablet by mouth daily 90 tablet 3    GABAPENTIN PO Take 900 mg by mouth nightly      LORazepam (ATIVAN) 0.5 MG tablet Take 0.5 mg by mouth daily as needed for Anxiety (& **Pulling Pain from CABG.**)       ibuprofen (ADVIL;MOTRIN) 800 MG tablet Take 800 mg by mouth every 8 hours as needed        No current facility-administered medications for this visit. All pertinent data reviewed and discussed with patient       ASSESSMENT/PLAN:    1. Coronary artery disease involving native coronary artery of native heart with angina pectoris  Rachna Travis has a known history of CAD. She has developed onset of chest pain with associated shortness of breath and arm heaviness which is similar symptoms prior to her CABG. Unfortunately treated has had false negative stress test and is concerned about pursuing further tress test due to this.   In the setting of  CAD and new onset of anginal symptoms recommend left heart catheterization for further evaluation of the progression of CAD. We will arrange for left heart catheterization  Encouraged to avoid excessive exercise. Continue with Imdur -Effient- Ranexa  - EKG 12 lead    2. Primary hypertension  Vitals:    05/04/22 1629   BP: 118/82   Pulse: 70   Blood pressure is well controlled with use of hydrochlorothiazide: Recommend to continue current dose      3. Mixed hyperlipidemia  Lipids noted from care everywhere October 18, 2021:  Lipids are not at goal is . In the setting of CAD LDL recommended to be 70 or less. She reports she was unable to tolerate atorvastatin: Currently on Zetia: Recommend repeat labs and if LDL not less than 100 we will start PCSK9        Medications reviewed and confirmed with patient         Tests ordered:  [facility]  Follow-up  After testing     Signed:  SUDHIR Jimenez CNP, 5/5/2022, 11:44 PM    An electronic signature was used to authenticate this note. Please note this report has been partially produced using speech recognition software and may contain errors related to that system including errors in grammar, punctuation, and spelling, as well as words and phrases that may be inappropriate. If there are any questions or concerns please feel free to contact the dictating provider for clarification.

## 2022-05-05 ASSESSMENT — ENCOUNTER SYMPTOMS
SHORTNESS OF BREATH: 1
ORTHOPNEA: 0

## 2022-05-23 ENCOUNTER — HOSPITAL ENCOUNTER (OUTPATIENT)
Age: 52
Discharge: HOME | End: 2022-05-23
Payer: COMMERCIAL

## 2022-05-23 ENCOUNTER — NURSE ONLY (OUTPATIENT)
Dept: CARDIOLOGY CLINIC | Age: 52
End: 2022-05-23

## 2022-05-23 ENCOUNTER — HOSPITAL ENCOUNTER (OUTPATIENT)
Dept: GENERAL RADIOLOGY | Age: 52
Discharge: HOME | End: 2022-05-23
Payer: COMMERCIAL

## 2022-05-23 DIAGNOSIS — Z01.810 PRE-OPERATIVE CARDIOVASCULAR EXAMINATION: ICD-10-CM

## 2022-05-23 LAB
ANION GAP SERPL CALCULATED.3IONS-SCNC: 9 MMOL/L (ref 4–16)
BASOPHILS ABSOLUTE: 0.1 K/CU MM
BASOPHILS RELATIVE PERCENT: 0.5 % (ref 0–1)
BUN BLDV-MCNC: 15 MG/DL (ref 6–23)
CALCIUM SERPL-MCNC: 9.3 MG/DL (ref 8.3–10.6)
CHLORIDE BLD-SCNC: 104 MMOL/L (ref 99–110)
CO2: 28 MMOL/L (ref 21–32)
CREAT SERPL-MCNC: 0.8 MG/DL (ref 0.6–1.1)
DIFFERENTIAL TYPE: ABNORMAL
EOSINOPHILS ABSOLUTE: 0.2 K/CU MM
EOSINOPHILS RELATIVE PERCENT: 1.9 % (ref 0–3)
GFR AFRICAN AMERICAN: >60 ML/MIN/1.73M2
GFR NON-AFRICAN AMERICAN: >60 ML/MIN/1.73M2
GLUCOSE BLD-MCNC: 88 MG/DL (ref 70–99)
HCT VFR BLD CALC: 46.7 % (ref 37–47)
HEMOGLOBIN: 14.9 GM/DL (ref 12.5–16)
IMMATURE NEUTROPHIL %: 0.5 % (ref 0–0.43)
LYMPHOCYTES ABSOLUTE: 3.1 K/CU MM
LYMPHOCYTES RELATIVE PERCENT: 29 % (ref 24–44)
MCH RBC QN AUTO: 29.8 PG (ref 27–31)
MCHC RBC AUTO-ENTMCNC: 31.9 % (ref 32–36)
MCV RBC AUTO: 93.4 FL (ref 78–100)
MONOCYTES ABSOLUTE: 0.9 K/CU MM
MONOCYTES RELATIVE PERCENT: 8.2 % (ref 0–4)
NUCLEATED RBC %: 0 %
PDW BLD-RTO: 12.5 % (ref 11.7–14.9)
PLATELET # BLD: 348 K/CU MM (ref 140–440)
PMV BLD AUTO: 8.7 FL (ref 7.5–11.1)
POTASSIUM SERPL-SCNC: 4.9 MMOL/L (ref 3.5–5.1)
RBC # BLD: 5 M/CU MM (ref 4.2–5.4)
SEGMENTED NEUTROPHILS ABSOLUTE COUNT: 6.5 K/CU MM
SEGMENTED NEUTROPHILS RELATIVE PERCENT: 59.9 % (ref 36–66)
SODIUM BLD-SCNC: 141 MMOL/L (ref 135–145)
TOTAL IMMATURE NEUTOROPHIL: 0.05 K/CU MM
TOTAL NUCLEATED RBC: 0 K/CU MM
WBC # BLD: 10.8 K/CU MM (ref 4–10.5)

## 2022-05-23 PROCEDURE — 99999 PR OFFICE/OUTPT VISIT,PROCEDURE ONLY: CPT | Performed by: INTERNAL MEDICINE

## 2022-05-23 PROCEDURE — 36415 COLL VENOUS BLD VENIPUNCTURE: CPT

## 2022-05-23 PROCEDURE — 80048 BASIC METABOLIC PNL TOTAL CA: CPT

## 2022-05-23 PROCEDURE — 85025 COMPLETE CBC W/AUTO DIFF WBC: CPT

## 2022-05-23 PROCEDURE — 71046 X-RAY EXAM CHEST 2 VIEWS: CPT

## 2022-05-23 RX ORDER — METHYLPREDNISOLONE 32 MG/1
TABLET ORAL
Qty: 2 TABLET | Refills: 0 | Status: SHIPPED | OUTPATIENT
Start: 2022-05-23

## 2022-05-23 NOTE — PROGRESS NOTES
Patient was here in office & educated on [facility] for Dx: Chest pain. Procedure is scheduled for 5/26/22  @ 7:00, w/arrival @ 5:45, @ [facility]. Pre-admission orders were given to patient for labs & CXR, which are due 5/23/22 @ [facility]. Procedure and risks were explained to patient. Consent forms were signed. Instructions were given to patient to remain NPO after midnight the night before procedure. Patient may take morning meds the morning of procedure with small amount of water. Patient is asked to call hospital @ [phone], 1 to 2 days before procedure to pre-register. Patient was notified that procedure could be delayed due to an emergency. Patient voiced understanding. Copies of consent, pre-testing orders, & information sheet scanned into media    Patient stated she has had difficulty with contrast dye in the past and she had difficulty breathing. Methylprednisolone 32 mg to be taken 1 tablet 12 hours before and 2 hours before the procedure were sent to her pharmacy. Patient was given instructions. Her allergy to Dye was added to her profile.

## 2022-05-25 ENCOUNTER — TELEPHONE (OUTPATIENT)
Dept: CARDIOLOGY CLINIC | Age: 52
End: 2022-05-25

## 2022-05-25 NOTE — TELEPHONE ENCOUNTER
Reminder call. Instructions reviewed. Patient acknowledged understanding.  Reminded of F/U appointment 6/27/22 @ 3:40

## 2022-05-26 ENCOUNTER — TELEPHONE (OUTPATIENT)
Dept: CARDIOLOGY CLINIC | Age: 52
End: 2022-05-26

## 2022-05-26 ENCOUNTER — HOSPITAL ENCOUNTER (OUTPATIENT)
Dept: CARDIAC CATH/INVASIVE PROCEDURES | Age: 52
Discharge: HOME | End: 2022-05-26
Attending: INTERNAL MEDICINE | Admitting: INTERNAL MEDICINE
Payer: COMMERCIAL

## 2022-05-26 VITALS
SYSTOLIC BLOOD PRESSURE: 144 MMHG | TEMPERATURE: 97.2 F | HEIGHT: 63 IN | HEART RATE: 79 BPM | WEIGHT: 213 LBS | RESPIRATION RATE: 17 BRPM | BODY MASS INDEX: 37.74 KG/M2 | DIASTOLIC BLOOD PRESSURE: 83 MMHG | OXYGEN SATURATION: 98 %

## 2022-05-26 LAB — ACTIVATED CLOTTING TIME, LOW RANGE: 213 SEC

## 2022-05-26 PROCEDURE — 6360000004 HC RX CONTRAST MEDICATION

## 2022-05-26 PROCEDURE — C1894 INTRO/SHEATH, NON-LASER: HCPCS

## 2022-05-26 PROCEDURE — 93459 L HRT ART/GRFT ANGIO: CPT | Performed by: INTERNAL MEDICINE

## 2022-05-26 PROCEDURE — C1769 GUIDE WIRE: HCPCS

## 2022-05-26 PROCEDURE — C1887 CATHETER, GUIDING: HCPCS

## 2022-05-26 PROCEDURE — 93459 L HRT ART/GRFT ANGIO: CPT

## 2022-05-26 PROCEDURE — 2709999900 HC NON-CHARGEABLE SUPPLY

## 2022-05-26 PROCEDURE — 85347 COAGULATION TIME ACTIVATED: CPT

## 2022-05-26 PROCEDURE — 93571 IV DOP VEL&/PRESS C FLO 1ST: CPT

## 2022-05-26 PROCEDURE — 6370000000 HC RX 637 (ALT 250 FOR IP): Performed by: INTERNAL MEDICINE

## 2022-05-26 PROCEDURE — 6360000002 HC RX W HCPCS

## 2022-05-26 PROCEDURE — C1760 CLOSURE DEV, VASC: HCPCS

## 2022-05-26 PROCEDURE — 2580000003 HC RX 258: Performed by: INTERNAL MEDICINE

## 2022-05-26 PROCEDURE — 93571 IV DOP VEL&/PRESS C FLO 1ST: CPT | Performed by: INTERNAL MEDICINE

## 2022-05-26 RX ORDER — SODIUM CHLORIDE 9 MG/ML
INJECTION, SOLUTION INTRAVENOUS PRN
Status: DISCONTINUED | OUTPATIENT
Start: 2022-05-26 | End: 2022-05-26 | Stop reason: HOSPADM

## 2022-05-26 RX ORDER — SODIUM CHLORIDE 0.9 % (FLUSH) 0.9 %
5-40 SYRINGE (ML) INJECTION EVERY 12 HOURS SCHEDULED
Status: DISCONTINUED | OUTPATIENT
Start: 2022-05-26 | End: 2022-05-26 | Stop reason: HOSPADM

## 2022-05-26 RX ORDER — DIPHENHYDRAMINE HCL 25 MG
25 TABLET ORAL ONCE
Status: COMPLETED | OUTPATIENT
Start: 2022-05-26 | End: 2022-05-26

## 2022-05-26 RX ORDER — ACETAMINOPHEN 325 MG/1
650 TABLET ORAL EVERY 4 HOURS PRN
Status: DISCONTINUED | OUTPATIENT
Start: 2022-05-26 | End: 2022-05-26 | Stop reason: HOSPADM

## 2022-05-26 RX ORDER — SODIUM CHLORIDE 0.9 % (FLUSH) 0.9 %
5-40 SYRINGE (ML) INJECTION PRN
Status: DISCONTINUED | OUTPATIENT
Start: 2022-05-26 | End: 2022-05-26 | Stop reason: HOSPADM

## 2022-05-26 RX ORDER — DIAZEPAM 5 MG/1
5 TABLET ORAL ONCE
Status: COMPLETED | OUTPATIENT
Start: 2022-05-26 | End: 2022-05-26

## 2022-05-26 RX ORDER — SODIUM CHLORIDE 9 MG/ML
INJECTION, SOLUTION INTRAVENOUS CONTINUOUS
Status: DISCONTINUED | OUTPATIENT
Start: 2022-05-26 | End: 2022-05-26 | Stop reason: HOSPADM

## 2022-05-26 RX ADMIN — DIPHENHYDRAMINE HCL 25 MG: 25 TABLET ORAL at 06:17

## 2022-05-26 RX ADMIN — SODIUM CHLORIDE: 9 INJECTION, SOLUTION INTRAVENOUS at 06:15

## 2022-05-26 RX ADMIN — DIAZEPAM 5 MG: 5 TABLET ORAL at 06:17

## 2022-05-26 NOTE — PROGRESS NOTES
Discharge instructions reviewed. Questions answered and patient verbalized understanding. PIV removed. Ambulated in hallway without difficulty. Femoral site WNL. Patient discharged to home.

## 2022-05-26 NOTE — H&P
SUBJECTIVE/OBJECTIVE:  Mayi Joy is a 47 y.o. female with a history of coronary artery disease, CABG x3, PCI hypertension, hyperlipidemia        HPI :   Mayi Joy reports she has noticed chest pain- sharp pain to a heaviness with associated bilateral arm heaviness and fatigue. She also reports also shortness of breath. The discomfort and fatigue is similar to what she had prior to her CABG.      Review of Systems   Constitutional: Negative for diaphoresis and malaise/fatigue. Cardiovascular: Positive for chest pain, dyspnea on exertion and leg swelling. Negative for claudication, irregular heartbeat, near-syncope, orthopnea, palpitations and paroxysmal nocturnal dyspnea. Respiratory: Positive for shortness of breath. Neurological: Negative for dizziness and light-headedness.         Vitals       Vitals:     05/04/22 1629   BP: 118/82   Site: Left Upper Arm   Position: Sitting   Cuff Size: Medium Adult   Pulse: 70   Weight: 214 lb (97.1 kg)   Height: 5' 4\" (1.626 m)         No flowsheet data found. Wt Readings from Last 3 Encounters:   05/04/22 214 lb (97.1 kg)   09/23/20 191 lb 9.6 oz (86.9 kg)   05/18/20 178 lb (80.7 kg)      Body mass index is 36.73 kg/m².     Physical Exam  Vitals reviewed. Constitutional:       Appearance: She is obese. Eyes:      Extraocular Movements: Extraocular movements intact. Pupils: Pupils are equal, round, and reactive to light. Neck:      Vascular: No carotid bruit. Cardiovascular:      Rate and Rhythm: Normal rate and regular rhythm. Pulses: Normal pulses. Heart sounds: Normal heart sounds. Pulmonary:      Effort: Pulmonary effort is normal.      Breath sounds: Normal breath sounds. Abdominal:      General: There is no distension. Tenderness: There is no abdominal tenderness. Musculoskeletal:      Right lower leg: No edema. Left lower leg: No edema. Skin:     General: Skin is warm and dry.       Capillary Refill: Capillary refill takes less than 2 seconds. Neurological:      General: No focal deficit present. Mental Status: She is alert and oriented to person, place, and time.                   Current Facility-Administered Medications   Current Outpatient Medications   Medication Sig Dispense Refill    ezetimibe (ZETIA) 10 MG tablet Take 10 mg by mouth daily        potassium chloride (MICRO-K) 10 MEQ extended release capsule Take 10 mEq by mouth States is taking every other day.        nitroGLYCERIN (NITROSTAT) 0.4 MG SL tablet Place 1 tablet under the tongue every 5 minutes as needed (as needed) 25 tablet 3    ranolazine (RANEXA) 1000 MG extended release tablet Take 1 tablet by mouth 2 times daily 60 tablet 5    prasugrel (EFFIENT) 10 MG TABS Take 1 tablet by mouth daily 90 tablet 0    isosorbide mononitrate (IMDUR) 120 MG extended release tablet Take 1 tablet by mouth daily 90 tablet 3    hydroCHLOROthiazide (HYDRODIURIL) 25 MG tablet Take 1 tablet by mouth daily 90 tablet 3    GABAPENTIN PO Take 900 mg by mouth nightly        LORazepam (ATIVAN) 0.5 MG tablet Take 0.5 mg by mouth daily as needed for Anxiety (& **Pulling Pain from CABG.**)         ibuprofen (ADVIL;MOTRIN) 800 MG tablet Take 800 mg by mouth every 8 hours as needed           No current facility-administered medications for this visit.               All pertinent data reviewed and discussed with patient        ASSESSMENT/PLAN:     1. Coronary artery disease involving native coronary artery of native heart with angina pectoris  Monica Ramirez has a known history of CAD. She has developed onset of chest pain with associated shortness of breath and arm heaviness which is similar symptoms prior to her CABG. Unfortunately treated has had false negative stress test and is concerned about pursuing further tress test due to this. In the setting of  CAD and new onset of anginal symptoms recommend left heart catheterization for further evaluation of the progression of CAD.   We will arrange for left heart catheterization  Encouraged to avoid excessive exercise. Continue with Imdur -Effient- Ranexa  - EKG 12 lead     2. Primary hypertension      Vitals:     05/04/22 1629   BP: 118/82   Pulse: 70   Blood pressure is well controlled with use of hydrochlorothiazide: Recommend to continue current dose        3. Mixed hyperlipidemia  Lipids noted from care everywhere October 18, 2021:  Lipids are not at goal is . In the setting of CAD LDL recommended to be 70 or less.   She reports she was unable to tolerate atorvastatin: Currently on Zetia: Recommend repeat labs and if LDL not less than 100 we will start PCSK9           Medications reviewed and confirmed with patient            Tests ordered:  [facility]

## 2022-05-26 NOTE — TELEPHONE ENCOUNTER
Signed FMLA form faxed to [facility]. Copy in chart. Called and advised patient that she can  forms at the .

## 2025-02-17 ENCOUNTER — OFFICE VISIT (OUTPATIENT)
Dept: CARDIOLOGY CLINIC | Age: 55
End: 2025-02-17
Payer: COMMERCIAL

## 2025-02-17 VITALS
DIASTOLIC BLOOD PRESSURE: 88 MMHG | HEART RATE: 81 BPM | BODY MASS INDEX: 42.36 KG/M2 | WEIGHT: 230.2 LBS | SYSTOLIC BLOOD PRESSURE: 128 MMHG | HEIGHT: 62 IN

## 2025-02-17 DIAGNOSIS — R07.9 CHEST PAIN, UNSPECIFIED TYPE: Primary | ICD-10-CM

## 2025-02-17 PROCEDURE — 3074F SYST BP LT 130 MM HG: CPT | Performed by: INTERNAL MEDICINE

## 2025-02-17 PROCEDURE — 93000 ELECTROCARDIOGRAM COMPLETE: CPT | Performed by: INTERNAL MEDICINE

## 2025-02-17 PROCEDURE — 3079F DIAST BP 80-89 MM HG: CPT | Performed by: INTERNAL MEDICINE

## 2025-02-17 PROCEDURE — 99214 OFFICE O/P EST MOD 30 MIN: CPT | Performed by: INTERNAL MEDICINE

## 2025-02-17 RX ORDER — ASPIRIN 81 MG/1
81 TABLET ORAL DAILY
COMMUNITY

## 2025-02-17 RX ORDER — LISINOPRIL 10 MG/1
10 TABLET ORAL DAILY
COMMUNITY
Start: 2024-10-24 | End: 2025-10-24

## 2025-02-17 NOTE — PROGRESS NOTES
CLINICAL STAFF DOCUMENTATION    Dr. Jeff Fragoso  1970  [id number]    Have you had any Chest Pain recently? - Yes  If Yes DO EKG   What type of pain is it? - Sharp Pain   Tender to palpate (touch)? No  When did the pain begin? - Months   How long does the pain last? -  seconds    How Severe is the pain? - 8  Is there anything that aggravates or triggers the pain?   Did you take any medication?    And did it relieve the pain -   Is there anything that relieves it?   Do you have any other symptoms at the same time?     DO EKG IF: Patient has a Heart Rate above 100 or below 40 (Ex:A-fib, Aflutter, PAF, SVT, VT, Bradycardia)      CAD (Coronary Artery Disease) patient should have one on file every 6 months     New Consult or New Patient     If patient is following up from a current Stent/PCI     If patient is following up from a current Cardioversion        Have you had any Shortness of Breath - Yes  When did it begin? - Months   If Yes - When  when walking stairs   How many flights of stairs can you go up without having SOB? - 2  Are you on Oxygen during the day or at night? -   How many liters of Oxygen are you on? -   How many pillows do you sleep with? -     Have you had any dizziness - No  If Yes DO ORTHOSTATIC BP including pulse  When do you feel dizzy?   Does the room spin?   How long does it last .       Have patient lie down for 5 minutes do supine (lying down), wait 1 minute then do sitting, wait 1 minute then do standing - log each in \"vitals\" area in Epic  Be sure to ask what symptoms they are having if they get dizzy while completing ortho stats such as: room spinning, nausea, etc.    Have you had any palpitations recently? - No  If Yes DO EKG - Do you feel your heart     When did they begin? -    How long do they last - .     Is there anything that aggravates or triggers them?   Is there anything that relieves them?   Any thyroid issues? -   How much caffeine? .       Is the patient on

## 2025-02-17 NOTE — PROGRESS NOTES
CARDIOLOGY NOTE      2/17/2025    RE: Sonia Fragoso  (1970)                               TO:  Júnior Buck (Inactive)            CHIEF COMPLAINT   Sonia is a 55 y.o. female who was seen today for management of  cad                                    HPI:                   The patient has H/o cad, htn, hyperlipidimea  Patient also seen  for    - Coronary artery disease, has chest pain. Patient is  compliant with prescribed medicines.   - Hypertension,is  well controlled, pt is  compliant with medicines  - Hyperlipidimea, importance of hyperlipidimea discussed with pt.       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    Restless leg 12/10/2014    Nerve root disorder 12/10/2014    CAD (coronary artery disease)     Angina pectoris syndrome (HCC) 11/26/2014    HLD (hyperlipidemia) 05/20/2013    Cannot sleep 05/20/2013    L-S radiculopathy 05/20/2013     Current Outpatient Medications   Medication Sig Dispense Refill    Evolocumab 140 MG/ML SOAJ Inject 140 mg into the skin every 14 days      lisinopril (PRINIVIL;ZESTRIL) 10 MG tablet Take 1 tablet by mouth daily      aspirin 81 MG EC tablet Take 1 tablet by mouth daily      nitroGLYCERIN (NITROSTAT) 0.4 MG SL tablet Place 1 tablet under the tongue every 5 minutes as needed (as needed) 25 tablet 3    hydroCHLOROthiazide (HYDRODIURIL) 25 MG tablet Take 1 tablet by mouth daily 90 tablet 3    GABAPENTIN PO Take 900 mg by mouth nightly      LORazepam (ATIVAN) 0.5 MG tablet Take 1 tablet by mouth daily as needed for

## 2025-02-17 NOTE — PATIENT INSTRUCTIONS
Thank you for allowing us to care for you today!   We want to ensure we can follow your treatment plan and we strive to give you the best outcomes and experience possible.   If you ever have a life threatening emergency and call 911 - for an ambulance (EMS)  REMEMBER  Our providers can only care for you at:   [facility] or [facility]   Even if you have someone take you or you drive yourself we can only care for you in a [facility] facility. Our providers are not setup at the other healthcare locations!    PLEASE CALL OUR OFFICE DURING NORMAL BUSINESS HOURS  Monday through Friday 8 am to 5 pm  AFTER HOURS the physician on-call cannot help with scheduling, rescheduling, procedure instruction questions or any type of medication need or issue.  [facility] P:[phone] - [facility] P:[phone] - [facility] P:[phone]      If you receive a survey:  We would appreciate you taking the time to share your experience concerning your provider visit in the office.    These surveys are confidential!  We are eager to improve and are counting on you to share your feedback so we can ensure you get the best care possible.

## 2025-02-21 ENCOUNTER — TELEPHONE (OUTPATIENT)
Dept: CARDIOLOGY CLINIC | Age: 55
End: 2025-02-21

## 2025-02-21 NOTE — TELEPHONE ENCOUNTER
Pt notified of results. Reminded of f/u appt. Pt voiced understanding.     Echo-    Left Ventricle: Normal left ventricular systolic function with a visually estimated EF of 55 - 60%. Left ventricle size is normal. Normal wall thickness. Normal wall motion. Grade I diastolic dysfunction with normal LAP.    No significant valvular disease or regurgitation noted.    Pericardium: No pericardial effusion.    Image quality is good.     Nuclear stress-  Interpretation Summary  Show Result Comparison     Stress Test: A Terence protocol stress test was performed. The patient reached stage 2 of the protocol and was stressed for 5 min and 17 sec. Hemodynamics are adequate for diagnosis. Blood pressure demonstrated a normal response and heart rate demonstrated a normal response to stress. The patient's heart rate recovery was normal.    Image quality is good.    No angina or ischemic EKG changes were seen Cardiolite study demonstrates normal tracer uptake noted in the anterior septal lateral and the inferior wall this is noted both on the stress and resting images ejection fraction by gated study 60% with normal global and regional left ventricular systolic function    Unremarkable Cardiolite stress test follow-up as scheduled

## 2025-03-17 ENCOUNTER — OFFICE VISIT (OUTPATIENT)
Dept: CARDIOLOGY CLINIC | Age: 55
End: 2025-03-17
Payer: COMMERCIAL

## 2025-03-17 VITALS
DIASTOLIC BLOOD PRESSURE: 88 MMHG | HEART RATE: 72 BPM | WEIGHT: 228 LBS | HEIGHT: 64 IN | SYSTOLIC BLOOD PRESSURE: 136 MMHG | BODY MASS INDEX: 38.93 KG/M2

## 2025-03-17 DIAGNOSIS — I25.10 ASCVD (ARTERIOSCLEROTIC CARDIOVASCULAR DISEASE): Primary | ICD-10-CM

## 2025-03-17 PROCEDURE — 3075F SYST BP GE 130 - 139MM HG: CPT | Performed by: INTERNAL MEDICINE

## 2025-03-17 PROCEDURE — 3079F DIAST BP 80-89 MM HG: CPT | Performed by: INTERNAL MEDICINE

## 2025-03-17 PROCEDURE — 99214 OFFICE O/P EST MOD 30 MIN: CPT | Performed by: INTERNAL MEDICINE

## 2025-03-17 RX ORDER — RANOLAZINE 1000 MG/1
1000 TABLET, EXTENDED RELEASE ORAL 2 TIMES DAILY
Qty: 180 TABLET | Refills: 3 | Status: SHIPPED | OUTPATIENT
Start: 2025-03-17

## 2025-03-17 RX ORDER — RANOLAZINE 500 MG/1
500 TABLET, EXTENDED RELEASE ORAL 2 TIMES DAILY
Qty: 60 TABLET | Refills: 3 | Status: SHIPPED | OUTPATIENT
Start: 2025-03-17 | End: 2025-03-17

## 2025-03-17 NOTE — PROGRESS NOTES
CLINICAL STAFF DOCUMENTATION    Dr. Jeff Fragoso  1970  [id number]    Have you had any Chest Pain recently? - about the same  If Yes DO EKG   What type of pain is it? - Sharp Pain   Tender to palpate (touch)? No  When did the pain begin? - Months   How long does the pain last? - 10 seconds    How Severe is the pain? - 10  Is there anything that aggravates or triggers the pain?   Did you take any medication?    And did it relieve the pain -   Is there anything that relieves it?   Do you have any other symptoms at the same time?       Have you had any Shortness of Breath - No    How many pillows do you sleep with under your head? -     Have you had any dizziness - No      Have you had any palpitations recently? - no    Any thyroid issues? - no    Do you have any edema - swelling in bilat ankles legs hands and feet     If Yes - CHECK TO SEE IF THE EDEMA IS PITTING  How long have they been having edema - Years  If Yes - Have they worn compression stockings at work  If they have worn compression stockings    Years    Vein \"LEG PROBLEM Questionnaire\"  Do you have prominent leg veins?  No   Do you have any skin discoloration?  No  Do you have any healed or active sores? No  Do you have swelling of the legs?  Yes  Do you have a family history of varicose veins? Yes  Does your profession involve pro-longed        standing or heavy lifting?    Yes  7.   Have you been fighting overweight problems? Yes  8.   Do you have restless legs?   Yes  9.   Do you have any night time cramps?  sometimes  10. Do you have any of the following in your legs:               Do you have a surgery or procedure scheduled in the near future - No    Do use tobacco products? - No  Do you drink alcohol? - No  Do you use any illicit drugs? - No  Caffeine? - Yes  How much caffeine? ./  cups       
a normal response to stress. The patient's heart rate recovery was normal.    Image quality is good.    No angina or ischemic EKG changes were seen Cardiolite study demonstrates normal tracer uptake noted in the anterior septal lateral and the inferior wall this is noted both on the stress and resting images ejection fraction by gated study 60% with normal global and regional left ventricular systolic function    Unremarkable Cardiolite stress test follow-up as scheduled           Has history of CABG and multiple PCI's on DAPT will continue                    Procedure Summary   LM PATENT   LAD 50% PROX LAD MORMAL IFR   CX MILD DISAESE   RCA PATENT   SVG TO OM ANEURYSMAL   LIMA NOT MATURE   EF 60      Recommendations   MEDICAL THERAPY   NORMAL IFR OF LAD      Signatures      --------------------------------------------------------   Electronically signed by Abi Booth MD   (Performing Physician) on 05/26/2022 at 10:55      -  Hypertension: Patients blood pressure is normal. Patient is advised about low sodium diet. Present medical regimen will not be changed.    On lisinopril will continue       -Will check an echocardiogram since patient has shortness of breath as well      -  LIPID MANAGEMENT:  Importance of lipid levels discussed with patient   and patient was given dietary advice. NCEP- ATP III guidelines reviewed with patient.    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                 ABI BOOTH MD    [facility]